# Patient Record
Sex: MALE
[De-identification: names, ages, dates, MRNs, and addresses within clinical notes are randomized per-mention and may not be internally consistent; named-entity substitution may affect disease eponyms.]

---

## 2024-08-21 ENCOUNTER — DASHBOARD ENCOUNTERS (OUTPATIENT)
Age: 39
End: 2024-08-21

## 2024-08-21 ENCOUNTER — OFFICE VISIT (OUTPATIENT)
Dept: URBAN - METROPOLITAN AREA CLINIC 60 | Facility: CLINIC | Age: 39
End: 2024-08-21
Payer: COMMERCIAL

## 2024-08-21 ENCOUNTER — LAB OUTSIDE AN ENCOUNTER (OUTPATIENT)
Dept: URBAN - METROPOLITAN AREA CLINIC 60 | Facility: CLINIC | Age: 39
End: 2024-08-21

## 2024-08-21 VITALS
WEIGHT: 223 LBS | HEART RATE: 48 BPM | HEIGHT: 70 IN | DIASTOLIC BLOOD PRESSURE: 78 MMHG | BODY MASS INDEX: 31.92 KG/M2 | OXYGEN SATURATION: 98 % | SYSTOLIC BLOOD PRESSURE: 118 MMHG | TEMPERATURE: 98.3 F

## 2024-08-21 DIAGNOSIS — K64.4 HEMORRHOIDS, EXTERNAL: ICD-10-CM

## 2024-08-21 DIAGNOSIS — Z86.19 HISTORY OF HEPATITIS C: ICD-10-CM

## 2024-08-21 PROCEDURE — 99204 OFFICE O/P NEW MOD 45 MIN: CPT

## 2024-08-21 RX ORDER — HYDROCORTISONE ACETATE PRAMOXINE HCL 2.5; 1 G/100G; G/100G
1 APPLICATION CREAM TOPICAL THREE TIMES A DAY
Qty: 15 GRAMS | Refills: 1 | OUTPATIENT
Start: 2024-08-21 | End: 2024-09-18

## 2024-08-21 NOTE — HPI-TODAY'S VISIT:
Patient is a 40 yo male coming in for evaluation of hemorrhoids and Hep C. Patient has been having issues with hemorrhoids x 1 month and felt rectal discomfort. No GI bleeding. He states since a few weeks ago he has felt better and feels his issue has resolved now.   Patient has a history of hepatitis C that he most likely got when he was using IV drugs 10 years ago. He was diagnosed 3 months ago and was treated with Epclusa. He states he wasn't following with a medical office but a van found him and tested him. Called [facility]. They have been conducting regular f/u with patient and are repeating his bloodwork soon. He will send us his records so we can take a look. Finished Epclusa 10 days ago.  No prior endoscopies. NO family history of GI malignancy.

## 2024-11-20 ENCOUNTER — OFFICE VISIT (OUTPATIENT)
Dept: URBAN - METROPOLITAN AREA CLINIC 60 | Facility: CLINIC | Age: 39
End: 2024-11-20